# Patient Record
Sex: FEMALE
[De-identification: names, ages, dates, MRNs, and addresses within clinical notes are randomized per-mention and may not be internally consistent; named-entity substitution may affect disease eponyms.]

---

## 2017-08-01 ENCOUNTER — HOSPITAL ENCOUNTER (OUTPATIENT)
Dept: PHYSICAL THERAPY | Facility: CLINIC | Age: 32
Setting detail: THERAPIES SERIES
Discharge: HOME | End: 2017-08-01

## 2017-08-01 PROCEDURE — 97112 NEUROMUSCULAR REEDUCATION: CPT

## 2017-08-01 PROCEDURE — 97110 THERAPEUTIC EXERCISES: CPT

## 2017-08-01 PROCEDURE — 97161 PT EVAL LOW COMPLEX 20 MIN: CPT

## 2017-08-10 ENCOUNTER — HOSPITAL ENCOUNTER (OUTPATIENT)
Dept: PHYSICAL THERAPY | Facility: CLINIC | Age: 32
Setting detail: THERAPIES SERIES
Discharge: HOME | End: 2017-08-10

## 2017-08-11 ENCOUNTER — HOSPITAL ENCOUNTER (OUTPATIENT)
Dept: PHYSICAL THERAPY | Facility: CLINIC | Age: 32
Setting detail: THERAPIES SERIES
Discharge: HOME | End: 2017-08-11

## 2017-08-11 PROCEDURE — 9990000011 HC NO CHARGE THERAPY VISIT

## 2017-11-08 ENCOUNTER — HOSPITAL ENCOUNTER (OUTPATIENT)
Dept: PHYSICAL THERAPY | Facility: CLINIC | Age: 32
Setting detail: THERAPIES SERIES
Discharge: HOME | End: 2017-11-08

## 2017-11-08 PROCEDURE — 97161 PT EVAL LOW COMPLEX 20 MIN: CPT

## 2017-11-08 PROCEDURE — 97140 MANUAL THERAPY 1/> REGIONS: CPT

## 2017-11-08 PROCEDURE — 9990000011 HC NO CHARGE THERAPY VISIT

## 2017-11-08 NOTE — CONSULTS
[address] [facility] Medicine Evaluation          Date:  2017  Patient: Amira Estrada  : 1985  MRN: [id number]  Physician: Dr. Nabil Khan DO   Insurance: Self-Pay  Medical Diagnosis: SI Pain, R side   Rehab Codes: M99.04  Onset date: 17  Next 's appt.: NA         Mechanism of injury:  Pt with SI pain over the last few months, worse with running causing her gait to be off and she is feeling popping in the right hip and peroneal irritation since. Went to see Chiropractor a few months ago, relieved but still having pain. Pain is focusing on R side SI, carrying her toddler can cause symptoms. PMH:  PT for core strength         Pain present? yes   Location R SI jt   Pain Rating currently 4/10   Pain at worse 5/10   Pain at best 0/10   Description of pain Constant dull ache, sharp at times   Altered Sensation none   What makes it worse Sleeping on the R side, running   What makes it better Running, sitting   Pain altered treatment/action no   Symptom progression    Sleep Can't sleep on right side                     Palpation/Pain:  RLE piriformis, hip flexor pain          Somatic Dysfunctions Normal Deficit Details   Cervical   [] []    Thoracic   [] [x] FRSR T4-T6   Rib   [] [x] R 4th-6th rib post  R 8th rib post   Pelvis   [] [x] R upslip  Pubic dysfunction   Lumbar [] [x] FRSR L4   SI   [] [x] Bilat extended sacrum            __________________________________________________________________  Assessment                      STG: (to be met in 10 treatments)  1. ? Pain: Pt to decrease pain levels to 0/10  2. ? ROM: Increase ROM to [facility] to allow for normal participation in sports  3. ? Strength: Increase strength to [facility] for normal sports and stability of joint   4. Independent with Home Exercise Programs    LTG: (to be met in 15 treatments)  1.  Return to participation in sport without restriction            ____________________________________________________________         Plan:  1 x week for 15

## 2017-12-26 ENCOUNTER — HOSPITAL ENCOUNTER (OUTPATIENT)
Dept: PHYSICAL THERAPY | Facility: CLINIC | Age: 32
Setting detail: THERAPIES SERIES
Discharge: HOME | End: 2017-12-26

## 2017-12-26 PROCEDURE — 9990000011 HC NO CHARGE THERAPY VISIT

## 2017-12-26 NOTE — FLOWSHEET NOTE
Increase ROM to [facility] to allow for normal participation in sports  3. ? Strength: Increase strength to [facility] for normal sports and stability of joint       4. Independent with Home Exercise Programs     LTG: (to be met in 15 treatments)  1. Return to participation in sport without restriction       Pt. Education:  [x] Yes  [] No  [] Reviewed Prior HEP/Ed  Method of Education: [x] Verbal  [] Demo  [] Written  Comprehension of Education:  [x] Verbalizes understanding. [] Demonstrates understanding. [] Needs review. [] Demonstrates/verbalizes HEP/Ed previously given. Plan: [x] Continue per plan of care. She is to run this afternoon and report back. Her pre run program should include LE ex and hip flexor stretches.     [] Other:      Time In:1402            Time Out: 1450    Electronically signed by:  Jenny Dangelo, PT

## 2018-02-15 ENCOUNTER — HOSPITAL ENCOUNTER (OUTPATIENT)
Dept: PHYSICAL THERAPY | Facility: CLINIC | Age: 33
Setting detail: THERAPIES SERIES
Discharge: HOME | End: 2018-02-15

## 2018-02-15 PROCEDURE — 9990000011 HC NO CHARGE THERAPY VISIT

## 2018-02-15 NOTE — FLOWSHEET NOTE
ROM to [facility] to allow for normal participation in sports  3. ? Strength: Increase strength to [facility] for normal sports and stability of joint       4. Independent with Home Exercise Programs     LTG: (to be met in 15 treatments)  1. Return to participation in sport without restriction        Pt. Education:  [x] Yes  [] No  [] Reviewed Prior HEP/Ed  Method of Education: [x] Verbal  [x] Demo  [x] Written  Comprehension of Education:  [x] Verbalizes understanding. [x] Demonstrates understanding. [] Needs review. [] Demonstrates/verbalizes HEP/Ed previously given. Plan: [x] Continue per plan of care.    [] Other:      Time In:1030            Time Out: [address]    Electronically signed by:  Shruthi Clarke, PT

## 2018-02-28 ENCOUNTER — HOSPITAL ENCOUNTER (OUTPATIENT)
Dept: PHYSICAL THERAPY | Facility: CLINIC | Age: 33
Setting detail: THERAPIES SERIES
Discharge: HOME | End: 2018-02-28

## 2018-02-28 PROCEDURE — 9990000011 HC NO CHARGE THERAPY VISIT

## 2018-02-28 NOTE — FLOWSHEET NOTE
[] Ada. [address] [facility] [employer]  [address] [place] [address]   Phone: [phone]   Fax:  [phone]     Physical Therapy Daily Treatment Note    Date:  2018  Patient Name:  Parul Garnica    :  1985  MRN: [id number]  Physician: Dr. Meena Love,     Insurance: Self-Pay  Medical Diagnosis: SI Pain, R side  Rehab Codes: M99.04  Onset date: 17                  Next 's appt.: NA  Visit# / total visits: /10  Cancels/No Shows: 0/0    Subjective:    Pain:  [x] Yes  [] No Location:RLE hip, SI, L/spine pain   Pain Rating: (0-10 scale)  Pain altered Tx:  [x] No  [] Yes  Action:  Comments: Pt has hamstring that aches constantly. Pain is more when the foot in on the ground. Eases up at 3 miles, but can keep going until 5-6 miles. As for LB, tightness in upper back. Has mid back pain when she goes to bed, for which she takes IB and uses a HP. Does not bother her when running. Affects her run quality. R piriformis. Doing HEP     Objective:  Exercises:  Exercise Reps/ Time Weight/ Level Comments   Supine      ADIM 2x10     Prone      ADIM 2x10     Quadruped      ADIM 2x10     Leg raise 15                             Other:  major hip ROM: wnl  MMT: 5/5 with all hip movements major  HS was 5/5 at 90/10 deg. No pain with fast high HS stretch  Palp: Major psoas and iliacus, Major paraspinals, neg on QL. Initially major glut med and piriformis were tender, but improved after DI to hip flexors and paraspinal.     Specific Instructions for next treatment:    Treatment Charges: Mins Units   []  Modalities     []  Ther Exercise     []  Manual Therapy     []  Ther Activities     []  Aquatics     []  Vasocompression     [x]  Other Self pay    Total Treatment time 40        Assessment: [x] Progressing toward goals. [] No change. [x] Other:  Pt educated on proper posture and not sitting to erect, so to decrease resting tone of ant pelvic tilt muscles.  Spent considerable time reviewing computer workstation

## 2018-04-02 ENCOUNTER — HOSPITAL ENCOUNTER (OUTPATIENT)
Dept: PHYSICAL THERAPY | Facility: CLINIC | Age: 33
Setting detail: THERAPIES SERIES
Discharge: HOME | End: 2018-04-02

## 2018-04-02 PROCEDURE — 9990000011 HC NO CHARGE THERAPY VISIT

## 2018-04-16 ENCOUNTER — HOSPITAL ENCOUNTER (OUTPATIENT)
Dept: PHYSICAL THERAPY | Facility: CLINIC | Age: 33
Setting detail: THERAPIES SERIES
Discharge: HOME | End: 2018-04-16

## 2018-04-16 PROCEDURE — 9990000011 HC NO CHARGE THERAPY VISIT

## 2018-04-26 ENCOUNTER — HOSPITAL ENCOUNTER (OUTPATIENT)
Dept: PHYSICAL THERAPY | Facility: CLINIC | Age: 33
Setting detail: THERAPIES SERIES
Discharge: HOME | End: 2018-04-26

## 2018-04-26 PROCEDURE — 9990000011 HC NO CHARGE THERAPY VISIT

## 2018-05-07 ENCOUNTER — HOSPITAL ENCOUNTER (OUTPATIENT)
Dept: PHYSICAL THERAPY | Facility: CLINIC | Age: 33
Setting detail: THERAPIES SERIES
Discharge: HOME | End: 2018-05-07

## 2018-05-07 PROCEDURE — 9990000011 HC NO CHARGE THERAPY VISIT

## 2018-05-22 ENCOUNTER — HOSPITAL ENCOUNTER (OUTPATIENT)
Dept: PHYSICAL THERAPY | Facility: CLINIC | Age: 33
Setting detail: THERAPIES SERIES
Discharge: HOME | End: 2018-05-22

## 2018-05-22 PROCEDURE — 9990000011 HC NO CHARGE THERAPY VISIT

## 2018-06-18 ENCOUNTER — HOSPITAL ENCOUNTER (OUTPATIENT)
Dept: PHYSICAL THERAPY | Facility: CLINIC | Age: 33
Setting detail: THERAPIES SERIES
Discharge: HOME | End: 2018-06-18

## 2018-06-18 PROCEDURE — 9990000011 HC NO CHARGE THERAPY VISIT

## 2018-06-26 ENCOUNTER — HOSPITAL ENCOUNTER (OUTPATIENT)
Dept: PHYSICAL THERAPY | Facility: CLINIC | Age: 33
Setting detail: THERAPIES SERIES
Discharge: HOME | End: 2018-06-26

## 2018-06-26 PROCEDURE — 9990000011 HC NO CHARGE THERAPY VISIT

## 2018-07-20 ENCOUNTER — HOSPITAL ENCOUNTER (OUTPATIENT)
Dept: PHYSICAL THERAPY | Facility: CLINIC | Age: 33
Setting detail: THERAPIES SERIES
Discharge: HOME | End: 2018-07-20

## 2018-07-20 NOTE — FLOWSHEET NOTE
[] [facility]        Outpatient Physical                Therapy       [address]       Phone: [phone]       Fax: [phone] [] [facility] at [address] [facility]       Phone: [phone]       Fax: [phone] [x] Ada.  [address] [place] [address]      Phone: [phone]      Fax:  [phone]     Physical Therapy Cancel/No Show note    Date: 2018  Patient: Guadalupe Izaguirre  : 1985  MRN: [id number]    Cancels/No Shows to date:     For today's appointment patient:  [x]  Cancelled  []  Rescheduled appointment  []  No-show     Reason given by patient:  []  Patient ill  []  Conflicting appointment  []  No transportation    []  Conflict with work  []  No reason given  []  Weather related  [x]  Other:     Comments:  Sick child   Electronically signed by: Marcin Thomson

## 2018-08-02 ENCOUNTER — HOSPITAL ENCOUNTER (OUTPATIENT)
Dept: PHYSICAL THERAPY | Facility: CLINIC | Age: 33
Setting detail: THERAPIES SERIES
Discharge: HOME | End: 2018-08-02

## 2018-08-02 PROCEDURE — 9990000011 HC NO CHARGE THERAPY VISIT

## 2018-08-10 ENCOUNTER — HOSPITAL ENCOUNTER (OUTPATIENT)
Dept: PHYSICAL THERAPY | Facility: CLINIC | Age: 33
Setting detail: THERAPIES SERIES
Discharge: HOME | End: 2018-08-10

## 2018-08-10 PROCEDURE — 9990000011 HC NO CHARGE THERAPY VISIT

## 2018-08-10 NOTE — FLOWSHEET NOTE
[] [facility] [address] [place] [address] [employer]  [address] [place] [address]   Phone: [phone]   Fax:  [phone]     Physical Therapy Daily Treatment Note    Date:  8/10/2018  Patient Name:  Jim Casiano    :  1985  MRN: [id number]  Physician: Dr. Burke Corral,      Insurance: Self-Pay  Medical Diagnosis: SI Pain, R side Rehab Codes: M99.04  Onset date: 17                  Next 's appt.: NA  Visit# / total visits: 12  Cancels/No Shows: 0/0    Subjective:    Pain:  [x] Yes  [] No Location:RLE hip, pelvis/SI  Pain Ratin/10 (0-10 scale)  Pain altered Tx:  [x] No  [] Yes  Action:  Comments: Pt with R side mild midback pain, cervical tightness noted. Pain is worse with activity, worse with leaning down and looking at the laptop. Objective:  Exercises:  Somatic Dysfunctions Normal Deficit Details   Cervical   [] [x] FSL OA-MET  RRot AA-MET  FRSL C3-MET  UT, L.scap MFR R   Thoracic   [] [x] FRSR T4-T5-MOB     Rib   [] [x] R 1st rib elevated-MET  R 2nd rib post  R 4-5th rib post-MET, MOB   Pelvis   [] []    Lumbar [] []    SI   [] []      Discussed Foam roller Tspine for HEP      Specific Instructions for next treatment: advance HEP, monitor posture     Treatment Charges: Mins Units   []  Modalities     []  Ther Exercise     []  Manual Therapy     []  Ther Activities     []  Aquatics     []  Vasocompression     []  Other Self pay    Total Treatment time 30        Assessment: [x] Progressing toward goals. Tolerated manual well, less pain and increased mobility note after     [] No change. [x] Other:       STG/LTG:        STG: (to be met in 10 treatments)  1. ? Pain: Pt to decrease pain levels to 0/10  2. ? ROM: Increase ROM to [facility] to allow for normal participation in sports  3. ? Strength: Increase strength to [facility] for normal sports and stability of joint       4. Independent with Home Exercise Programs     LTG: (to be met in 15 treatments)  1.  Return to participation in sport without restriction Pt. Education:  [x] Yes  [] No  [] Reviewed Prior HEP/Ed  Method of Education: [x] Verbal  [x] Demo  [] Written   Comprehension of Education:  [x] Verbalizes understanding. [x] Demonstrates understanding. [] Needs review. [] Demonstrates/verbalizes HEP/Ed previously given. Plan: [] Continue per plan of care. [x] Other: continue as needed.        Time In: 1130  Time Out:1202    Electronically signed by:  Kilo Parker, PT

## 2018-09-11 ENCOUNTER — HOSPITAL ENCOUNTER (OUTPATIENT)
Dept: PHYSICAL THERAPY | Facility: CLINIC | Age: 33
Setting detail: THERAPIES SERIES
Discharge: HOME | End: 2018-09-11

## 2018-09-11 PROCEDURE — 9990000011 HC NO CHARGE THERAPY VISIT

## 2018-09-11 NOTE — FLOWSHEET NOTE
[] [facility]  [address] [facility]   Phone: [phone]   Fax:  [phone]     Physical Therapy Daily Treatment Note    Date:  2018  Patient Name:  Joie Rasheed    :  1985  MRN: [id number]  Physician: Dr. Olivia Cuellar, DO     Insurance: PT retail self-pay  Medical Diagnosis: SI Pain, R side Rehab Codes: M99.04  Onset date: 17                  Next 's appt.: NA  Visit# / total visits: 12  Cancels/No Shows: 0/0    Subjective:    Pain:  [x] Yes  [] No Location:RLE hip, pelvis/SI  Pain Ratin/10 (0-10 scale)  Pain altered Tx:  [x] No  [] Yes  Action:  Comments: returns to PT for persistent R calf/peroneal pain. On her last 18 mile run, she felt awful in her legs and felt dizzy, but notes she was also ill. Doing HEP, minimal mileage. Objective:  Exercises:  Home Exercise Reps/ Time Weight/ Level Comments   Banded hip drive 20 black    Lower post sling ex 20 blue    DKTC in pushup position on PB  blue Hands on floor   Marches in crab walk position 10     Eccentric calf raises 15 x 6 second drop; standing and seated. Other:  Manual: stick rolled R calf, peroneals and quad. Reviewed  Nutrition pre run and during the run. Specific Instructions for next treatment:    Treatment Charges: Mins Units   []  Modalities     []  Ther Exercise     []  Manual Therapy     []  Ther Activities     []  Aquatics     []  Vasocompression     []  Other Self pay    Total Treatment time 30        Assessment: [x] Progressing toward goals. presents with a significant amount of tenderness in LE muscles on today's date. Encouraged her to do a little more preventative maintenance to lower legs (rolling with assistance, eccentrics) as well as improving nutrition. Referred her to [employer] for additional information. [] No change.      [x] Other:       STG/LTG:        STG: (to be met in 10 treatments)  1. ? Pain: Pt to decrease pain levels to 0/10  2. ? ROM: Increase ROM to [facility] to allow for normal participation in sports  3. ? Strength: Increase strength to [facility] for normal sports and stability of joint       4. Independent with Home Exercise Programs     LTG: (to be met in 15 treatments)  1. Return to participation in sport without restriction     Pt. Education:  [x] Yes  [] No  [] Reviewed Prior HEP/Ed  Method of Education: [x] Verbal  [x] Demo  [] Written   Comprehension of Education:  [x] Verbalizes understanding. [x] Demonstrates understanding. [] Needs review. [] Demonstrates/verbalizes HEP/Ed previously given. Plan: [] Continue per plan of care. [x] Other: continue as needed. She has 6 wks to [address] She will try adding a 3 mile tempo later this week. She will also monitor resting heart rate to ensure that she is not overtraining. If she thinks she is not ready for full, she may drop to the half and use [place] as a half [place] race.    Time In: 0900   Time LZD:6705    Electronically signed by:  Ciara Guzman, PT

## 2018-09-28 ENCOUNTER — HOSPITAL ENCOUNTER (OUTPATIENT)
Dept: PHYSICAL THERAPY | Facility: CLINIC | Age: 33
Setting detail: THERAPIES SERIES
Discharge: HOME | End: 2018-09-28

## 2018-09-28 PROCEDURE — 9990000011 HC NO CHARGE THERAPY VISIT

## 2018-09-28 NOTE — FLOWSHEET NOTE
[] Ada. [address] [facility] [employer]  [address] [place] [address]   Phone: [phone]   Fax:  [phone]     Physical Therapy Daily Treatment Note    Date:  2018  Patient Name:  Gilberto Stapleton    :  1985  MRN: [id number]  Physician: Dr. Vincent Cortez,      Insurance: Self-Pay  Medical Diagnosis: SI Pain, R side Rehab Codes: M99.04  Onset date: 17                  Next Dr's appt.: NA  Visit# / total visits: 14  Cancels/No Shows: 0/0    Subjective:    Pain:  [x] Yes  [] No Location:RLE hip, pelvis/SI  Pain Ratin/10 (0-10 scale)  Pain altered Tx:  [x] No  [] Yes  Action:  Comments: Pt with Left side midback pain, RLE hip and post thigh tightness. Reports adherence to HEP but has slacked on it the last few weeks. Objective:  Exercises:  Somatic Dysfunctions Normal Deficit Details   Cervical   [] [x]   UT, L.scap MFR R   Thoracic   [] [x] FRSR T4-T5-MOB     Rib   [] [x] L 1st rib elevated-MET  L/R 2nd rib post  L 4-6th rib post-MET, MOB   Pelvis   [] []    Lumbar [] []    SI   [] [x] RLE piriformis, hip flexor DI     Discussed Foam roller Tspine for HEP      Specific Instructions for next treatment: advance HEP, monitor posture     Treatment Charges: Mins Units   []  Modalities     []  Ther Exercise     []  Manual Therapy     []  Ther Activities     []  Aquatics     []  Vasocompression     []  Other Self pay    Total Treatment time 30        Assessment: [x] Progressing toward goals. Tolerated manual with discomfort during but relief of symptoms after     [] No change. [] Other:       STG/LTG:        STG: (to be met in 10 treatments)  1. ? Pain: Pt to decrease pain levels to 0/10  2. ? ROM: Increase ROM to [facility] to allow for normal participation in sports  3. ? Strength: Increase strength to [facility] for normal sports and stability of joint       4. Independent with Home Exercise Programs     LTG: (to be met in 15 treatments)  1. Return to participation in sport without restriction     Pt. Education:  [x] Yes  [] No  [] Reviewed Prior HEP/Ed  Method of Education: [x] Verbal  [x] Demo  [] Written   Comprehension of Education:  [x] Verbalizes understanding. [x] Demonstrates understanding. [] Needs review. [] Demonstrates/verbalizes HEP/Ed previously given. Plan: [] Continue per plan of care. [x] Other: continue as needed.        Time In: 1600 Time Out:1630    Electronically signed by:  Argelia Chinchilla PT

## 2018-10-23 ENCOUNTER — HOSPITAL ENCOUNTER (OUTPATIENT)
Dept: PHYSICAL THERAPY | Facility: CLINIC | Age: 33
Discharge: HOME | End: 2018-10-23

## 2018-10-23 PROCEDURE — 9900000073 HC MANUAL THERAPY PER 15 MIN (SELF-PAY)

## 2018-11-02 ENCOUNTER — HOSPITAL ENCOUNTER (OUTPATIENT)
Dept: PHYSICAL THERAPY | Facility: CLINIC | Age: 33
Discharge: HOME | End: 2018-11-02

## 2018-11-02 PROCEDURE — 9900000073 HC MANUAL THERAPY PER 15 MIN (SELF-PAY)

## 2018-12-13 ENCOUNTER — APPOINTMENT (OUTPATIENT)
Dept: PHYSICAL THERAPY | Facility: CLINIC | Age: 33
End: 2018-12-13
Payer: COMMERCIAL

## 2018-12-14 ENCOUNTER — HOSPITAL ENCOUNTER (OUTPATIENT)
Dept: PHYSICAL THERAPY | Facility: CLINIC | Age: 33
Setting detail: THERAPIES SERIES
Discharge: HOME | End: 2018-12-14
Payer: COMMERCIAL

## 2018-12-14 PROCEDURE — 97140 MANUAL THERAPY 1/> REGIONS: CPT

## 2019-01-03 ENCOUNTER — HOSPITAL ENCOUNTER (OUTPATIENT)
Dept: PHYSICAL THERAPY | Facility: CLINIC | Age: 34
Setting detail: THERAPIES SERIES
Discharge: HOME | End: 2019-01-03
Payer: COMMERCIAL

## 2019-01-03 PROCEDURE — 97140 MANUAL THERAPY 1/> REGIONS: CPT

## 2019-02-28 ENCOUNTER — HOSPITAL ENCOUNTER (OUTPATIENT)
Dept: PHYSICAL THERAPY | Facility: CLINIC | Age: 34
Setting detail: THERAPIES SERIES
Discharge: HOME | End: 2019-02-28
Payer: COMMERCIAL

## 2019-02-28 PROCEDURE — 97140 MANUAL THERAPY 1/> REGIONS: CPT

## 2019-08-22 ENCOUNTER — HOSPITAL ENCOUNTER (OUTPATIENT)
Dept: PHYSICAL THERAPY | Facility: CLINIC | Age: 34
Discharge: HOME | End: 2019-08-22

## 2019-08-22 PROCEDURE — 9900000073 HC MANUAL THERAPY PER 15 MIN (SELF-PAY)

## 2019-08-22 PROCEDURE — 9900000066 HC EVALUATION (SELF-PAY)

## 2019-08-22 PROCEDURE — 9900000072 HC NEUROMUSCULAR RE-EDUCATION (SELF-PAY)

## 2019-08-22 PROCEDURE — 9900000067 HC THERAPEUTIC EXERCISE EA 15 MINS (SELF-PAY)

## 2019-08-22 NOTE — CONSULTS
[x] [facility] and Therapy    [address] [place] [address]    Phone: [phone]    Fax:  [phone]     Physical Therapy Running Evaluation    Date:  2019  Patient: Dayday Santiago   : 1985  MRN: [id number]  Physician: Dr. Jackie Baker: Retail [employer]  Medical Diagnosis: R hip pain Rehab Codes: M25.551  Onset date: 19   Next Dr's appt.: prn    Subjective:  CC:  Stopped running at week 24 of pregnancy because it was uncomfortable at that point. Baby is now 9 weeks old. Return to run with run/walk up to 2 miles Did that for a couple weeks then started just running. Going 2-3 miles 2-3 days per week. Having R hip an Rib pain. Also get pelvic pain. Imani is at 160 spm and just can't seem to get that imani up. Describes the hip as an annoying pain.  Better with  stretch and rest. Have to give a rest day between runs due to pubic symphysis pain       PMHx: [] Unremarkable [] Diabetes [] HTN  [] Pacemaker   [] MI/Heart Problems [] Cancer [] Arthritis [] Other:              [x] Refer to full medical chart  In EPIC     Tests: [] X-Ray: [] MRI:  [x] none:     Medications: [x] Refer to full medical record [] None [] Other:  Allergies:      [x] Refer to full medical record [] None [] Other:    Working:  [x] Normal Duty  [] Light Duty  [] Off D/T Condition  [] Retired    [] Not Employed    []  Disability  [] Other:           Return to work:     Job/ADL Description:        Pain:  [x] Yes  [] No Location: R hip  Pain Rating: (0-10 scale) 5/10 worst, 3/10 now  Pain altered Tx:  [] Yes  [] No  Action:  Symptoms:  [] Improving [] Worsening [x] Same  Better:  [] AM    [] PM    [] Sit    [x] Not running  []Stand    [] Walk    [x] Stretching  [] Other:  Worse: [] AM    [] PM    [] Sit    []Stand    [] Walk    [] Stairs    [x] Run    [] Other:  Sleep: [x] OK    [] Disturbed    Objective:    ROM  ° A/P STRENGTH TESTS (+/-) Left Right Not Tested

## 2019-08-29 ENCOUNTER — HOSPITAL ENCOUNTER (OUTPATIENT)
Dept: PHYSICAL THERAPY | Facility: CLINIC | Age: 34
Discharge: HOME | End: 2019-08-29

## 2019-08-29 PROCEDURE — 9900000067 HC THERAPEUTIC EXERCISE EA 15 MINS (SELF-PAY)

## 2019-08-29 PROCEDURE — 9900000073 HC MANUAL THERAPY PER 15 MIN (SELF-PAY)

## 2019-08-29 PROCEDURE — 9900000072 HC NEUROMUSCULAR RE-EDUCATION (SELF-PAY)

## 2019-08-29 NOTE — FLOWSHEET NOTE
[] [address]    Outpatient Rehabilitation &  Therapy  [address] [place] [address]  P: [phone]  F: [phone]     Physical Therapy Daily Treatment Note    Date:  2019  Patient Name:  Tonny Langford    :  1985  MRN: [id number]  Medical Diagnosis: R hip pain          Rehab Codes: M25.551  Onset date: 19                           Next Dr's appt.: prn    Subjective:    Pain:  [] Yes  [] No Location: R hip Pain Rating: (0-10 scale) 0/10  Pain altered Tx:  [] No  [] Yes  Action:  Comments:Feeling better. Did get a little kick up of my plantarfascitis that I've had in the past however I was carrying my bike barefoot and that is when it flared. Pelvic pain is pretty much gone with running.     Objective:  Manual: MET to correct R pelvic upslip, MOB FRSR T6-8, MET Post rib 6-8, DI B hip flexor prox, distal, glute med  Precautions: standard  Exercises:  Exercise Reps/ Time Weight/ Level Issued for HEP   Comments   Prone             Flying squirrels  x15    x  x  isometric   Hip ext (glut max)                           Supine             Hip flexor s 2'   x x     1 legged bridges              bridges x20   x x     Pelvic floor yarely 5sec hold 5 sec  eccentric   x      Sidelying             Warrior stretch x5 ea   x x Pillow between knees   [place] stretch x5 ea   x x Pillow between knees   Clams 90/30 deg 2 sets   x x     Mary Jo hip abd 2 sets   x x     Gym             Arm Circles x20 [place] x x    Pull aparts x20 lime x x    Lunges             Monster walks             Heel taps             Step downs         Posterior and lateral   Other:      Specific Instructions for next treatment:Add quadruped  NMR   Warm up: 3 min walk              Pace:  11:06 min/mile              Duration: 16 minutes                 Shoes: Miller pureflow              Nora: 176  spm    Cues: metronome to cue nora and lift heel at toe off  Treatment Charges: Mins Units   []  Modalities     [x]  Ther Exercise 20 1

## 2019-09-12 ENCOUNTER — HOSPITAL ENCOUNTER (OUTPATIENT)
Dept: PHYSICAL THERAPY | Facility: CLINIC | Age: 34
Discharge: HOME | End: 2019-09-12

## 2019-09-12 PROCEDURE — 9900000072 HC NEUROMUSCULAR RE-EDUCATION (SELF-PAY)

## 2019-09-12 PROCEDURE — 9900000073 HC MANUAL THERAPY PER 15 MIN (SELF-PAY)

## 2019-10-28 ENCOUNTER — HOSPITAL ENCOUNTER (OUTPATIENT)
Dept: PHYSICAL THERAPY | Facility: CLINIC | Age: 34
Discharge: HOME | End: 2019-10-28

## 2019-10-28 PROCEDURE — 9900000067 HC THERAPEUTIC EXERCISE EA 15 MINS (SELF-PAY)

## 2019-10-28 PROCEDURE — 9900000073 HC MANUAL THERAPY PER 15 MIN (SELF-PAY)

## 2019-10-28 PROCEDURE — 9900000072 HC NEUROMUSCULAR RE-EDUCATION (SELF-PAY)

## 2019-11-25 ENCOUNTER — OFFICE VISIT (OUTPATIENT)
Dept: ORTHOPEDIC SURGERY | Age: 34
End: 2019-11-25
Payer: COMMERCIAL

## 2019-11-25 VITALS
WEIGHT: 145 LBS | DIASTOLIC BLOOD PRESSURE: 74 MMHG | HEIGHT: 67 IN | HEART RATE: 64 BPM | SYSTOLIC BLOOD PRESSURE: 113 MMHG | BODY MASS INDEX: 22.76 KG/M2

## 2019-11-25 DIAGNOSIS — M21.862 GASTROCNEMIUS EQUINUS OF LEFT LOWER EXTREMITY: Primary | ICD-10-CM

## 2019-11-25 DIAGNOSIS — M20.22 HALLUX RIGIDUS OF LEFT FOOT: ICD-10-CM

## 2019-11-25 DIAGNOSIS — M21.6X9 CAVUS DEFORMITY OF FOOT, ACQUIRED: ICD-10-CM

## 2019-11-25 PROCEDURE — 99203 OFFICE O/P NEW LOW 30 MIN: CPT | Performed by: ORTHOPAEDIC SURGERY

## 2019-11-25 RX ORDER — NORETHINDRONE AND ETHINYL ESTRADIOL AND FERROUS FUMARATE 0.8-25(24)
1 KIT ORAL
COMMUNITY
Start: 2019-09-18

## 2019-12-03 ENCOUNTER — HOSPITAL ENCOUNTER (OUTPATIENT)
Dept: PHYSICAL THERAPY | Facility: CLINIC | Age: 34
Discharge: HOME | End: 2019-12-03

## 2019-12-03 PROCEDURE — 9900000073 HC MANUAL THERAPY PER 15 MIN (SELF-PAY)

## 2019-12-17 ENCOUNTER — HOSPITAL ENCOUNTER (OUTPATIENT)
Dept: PHYSICAL THERAPY | Facility: CLINIC | Age: 34
Discharge: HOME | End: 2019-12-17

## 2019-12-17 PROCEDURE — 9900000067 HC THERAPEUTIC EXERCISE EA 15 MINS (SELF-PAY)

## 2020-01-28 ENCOUNTER — HOSPITAL ENCOUNTER (OUTPATIENT)
Dept: PHYSICAL THERAPY | Facility: CLINIC | Age: 35
Discharge: HOME | End: 2020-01-28

## 2020-01-28 PROCEDURE — 9900000073 HC MANUAL THERAPY PER 15 MIN (SELF-PAY)

## 2020-01-28 NOTE — FLOWSHEET NOTE
proximal iliacus and psoas, distal attachment, piriformis, and glut med  2. Shotgun maneuver    Specific Instructions for next treatment:      Treatment Charges: Mins Units   []  Modalities     []  Ther Exercise     [x]  Manual Therapy 20 1   []  Ther Activities     []  Aquatics     []  Vasocompression     []  NMR     Total Treatment time 20 1       Assessment: [x] Progressing toward goals. Initially she presented with significant tenderness on hip flexor, piriformis and glut med. + sheer test on R, R hip abd MMT was 4+/5 and limited hip extension. However, after manual, sheer test was neg, tenderness had improved, R hip abd MMT improved to 5/5 and has full hip extension. [] No change. [] Other:        STG: (to be met in 10 treatments)  1. ? Pain: <3/10 at worst to allow pt to continue to run  2. ? Strength:5/5 B hip strength to allow pt to hold corrections made with manual techniques  3. ? Function: Pt will be able to run up to 4 miles with <3/10 pain Hip, ribs and pelvis  4. Independent with Home Exercise Programs     LTG: (to be met in 20 treatments)  1. Increase functional strength to allow pt to demonstrate little to no deviation with single leg squat   2. Pt able to run painfree as she wishes with acceptable run mechanics. Patient goals:Build strength so can return to 25 miles/wk of running and run 1/2 marathon at [facility]      Pt. Education:  [x] Yes  [] No  [] Reviewed Prior HEP/Ed  Method of Education: [x] Verbal  [] Demo  [x] Written- for glut max ex and calf S  Comprehension of Education:  [x] Verbalizes understanding. [] Demonstrates understanding. [] Needs review. [] Demonstrates/verbalizes HEP/Ed previously given. Plan: [x] Continue per plan of care.    [x] Other: she wishes to follow up as needed    Time In: 1405   Time Out: 1425    Electronically signed by:  Versie Paget, PT

## 2020-02-11 ENCOUNTER — HOSPITAL ENCOUNTER (OUTPATIENT)
Dept: PHYSICAL THERAPY | Facility: CLINIC | Age: 35
Discharge: HOME | End: 2020-02-11

## 2020-02-20 ENCOUNTER — HOSPITAL ENCOUNTER (OUTPATIENT)
Dept: PHYSICAL THERAPY | Facility: CLINIC | Age: 35
Discharge: HOME | End: 2020-02-20

## 2020-02-20 PROCEDURE — 9900000067 HC THERAPEUTIC EXERCISE EA 15 MINS (SELF-PAY)

## 2020-02-20 PROCEDURE — 9900000073 HC MANUAL THERAPY PER 15 MIN (SELF-PAY)

## 2020-05-12 ENCOUNTER — HOSPITAL ENCOUNTER (OUTPATIENT)
Dept: PHYSICAL THERAPY | Facility: CLINIC | Age: 35
Discharge: HOME | End: 2020-05-12

## 2020-05-12 PROCEDURE — 9900000073 HC MANUAL THERAPY PER 15 MIN (SELF-PAY)

## 2020-05-12 PROCEDURE — 9900000067 HC THERAPEUTIC EXERCISE EA 15 MINS (SELF-PAY)

## 2020-05-21 ENCOUNTER — HOSPITAL ENCOUNTER (OUTPATIENT)
Dept: PHYSICAL THERAPY | Facility: CLINIC | Age: 35
Discharge: HOME | End: 2020-05-21

## 2020-05-21 PROCEDURE — 9900000073 HC MANUAL THERAPY PER 15 MIN (SELF-PAY)

## 2020-05-21 PROCEDURE — 9900000072 HC NEUROMUSCULAR RE-EDUCATION (SELF-PAY)

## 2020-05-21 PROCEDURE — 9900000067 HC THERAPEUTIC EXERCISE EA 15 MINS (SELF-PAY)

## 2020-05-21 NOTE — FLOWSHEET NOTE
[] [address]   Outpatient Rehabilitation &  Therapy  [address] [facility]  P: [phone]  F: [phone]     Physical Therapy Daily Treatment Note    Date:  2020  Patient Name:  Naida Dimas    :  1985  MRN: [id number]  Physician: Dr. Carroll Kulkarni: [employer]  Medical Diagnosis: R hip pain         Rehab Codes: M25.551  Onset date: 19                          Next 's appt.: prn  Visits:     Cancellations/No shows:  0/0    Subjective:    Pain:  [x] Yes  [] No Location: R hip Pain Rating: (0-10 scale) 4/10  Pain altered Tx:  [x] No  [] Yes  Action:  Comments:Pt reports she is back to having R hip pain again and can only do 2-3 miles.      Objective:  Manual: MET to correct pelvic dysfunction, DI  Glute med, piriformis,  Posterior glide distal tib/fib R  Precautions: standard  Exercises:  Exercise Reps/ Time Weight/ Level Issued for HEP   Comments   Prone             Flying squirrels 2x10  12/17  2 pillows   Hip ext (glut max)  2x10   /17  2 pillows; monitor pelvic rotation   ADIM 15   /17      Supine             Hip flexor s 2'   x      Posture Bridges w/march 2 sets   x  x    Bridge  20   x  No pelvic rotation   Pelvic floor yarely 5sec hold 5 sec  eccentric   x      Quadruped        ADIM 20  12/17     Arm/leg raise 20  /17     Sidelying             Warrior stretch x5 ea   x -- Pillow between knees   [place] stretch x5 ea   x -- Pillow between knees   Clams 90/30 deg 2 sets   x --     Mary Jo hip abd 2 sets   x --     Gym             Supine thoracic extension on 1/2 foam roller x3'       Wall pushover running drill x30   x    [place] Single leg deadlift 2x15 [place]  KB  x barefoot    plank w/ KB pull through Verbal review Purple KB x     MOBO board deadlift 2x10  x     Dynamic lunge 2laps  x     Running drill 2x30\"  x     Arm Circles x20 Blue x     Pull aparts x20 Blue x     Eccentric calf 1 set  x     Lunges             Monster walks  2 laps

## 2020-08-06 ENCOUNTER — HOSPITAL ENCOUNTER (OUTPATIENT)
Dept: PHYSICAL THERAPY | Facility: CLINIC | Age: 35
Discharge: HOME | End: 2020-08-06

## 2020-08-06 PROCEDURE — 9900000067 HC THERAPEUTIC EXERCISE EA 15 MINS (SELF-PAY)

## 2020-08-06 PROCEDURE — 9900000073 HC MANUAL THERAPY PER 15 MIN (SELF-PAY)

## 2020-08-06 NOTE — FLOWSHEET NOTE
[] [address]   Outpatient Rehabilitation &  Therapy  [address] [facility]  P: [phone]  F: [phone]     Physical Therapy Daily Treatment Note    Date:  2020  Patient Name:  Jessica Coffey    :  1985  MRN: [id number]  Physician: Dr. Lara Bones: [employer]  Medical Diagnosis: R hip pain         Rehab Codes: M25.551  Onset date: 19                          Next 's appt.: prn  Visits: 10/20    Cancellations/No shows:  0/0    Subjective:    Pain:  [x] Yes  [] No Location: R hip Pain Rating: (0-10 scale) 1-2/10  Pain altered Tx:  [x] No  [] Yes  Action:  Comments:Pt reports her hips and upper back have been somewhat painful.  SHe discovered part of the pain was from poor posture at her standing desk so after she changed this the pain reduced to the current level     Objective:  Manual: MET to correct pelvic dysfunction, DI hip flexor proximal and distal, glute med, MOB FRSR T6-8, MET R post rib 7  Precautions: standard  Exercises:  Exercise Reps/ Time Weight/ Level Issued for HEP   Comments   Prone             Flying squirrels 2x10  x x 2 pillows   Hip ext (glut max)  2x10   12/17  2 pillows; monitor pelvic rotation   ADIM 15   12/      Supine             Hip flexor s 2'   x      Posture Bridges w/march 2 sets   x      Bridge  20   x  No pelvic rotation   Pelvic floor yarely 5sec hold 5 sec  eccentric   x      Quadruped        ADIM 20  12/17     Arm/leg raise 20  12/17     Sidelying             Warrior stretch x5 ea   x x Pillow between knees   [place] stretch x5 ea   x x Pillow between knees   Clams 90/30 deg 2 sets   x --     Side plank dips x10   x x     Gym             [place] calf stretch 2'ea   x    Supine thoracic extension on 1/2 foam roller x3'       Wall pushover running drill x30       [place] Single leg deadlift 2x15 [place]  KB   barefoot    plank w/ KB pull through Verbal review Purple KB x     MOBO board deadlift 2x10  x     Dynamic Demonstrates/verbalizes HEP/Ed previously given. Plan: [x] Continue per plan of care.    [x] Other:Follow up prn  Time In: 1810 Time Out: [address] [place]    Electronically signed by:  Vicky Valdez PT

## 2020-09-24 ENCOUNTER — HOSPITAL ENCOUNTER (OUTPATIENT)
Dept: PHYSICAL THERAPY | Facility: CLINIC | Age: 35
Discharge: HOME | End: 2020-09-24

## 2020-09-24 PROCEDURE — 9900000073 HC MANUAL THERAPY PER 15 MIN (SELF-PAY)

## 2020-09-24 NOTE — FLOWSHEET NOTE
[] [address]   Outpatient Rehabilitation &  Therapy  1500 [facility]  P: [phone]  F: [phone]     Physical Therapy Daily Treatment Note    Date:  2020  Patient Name:  Melissa Jj    :  1985  MRN: [id number]  Physician: Dr. Dove Levo: [employer]  Medical Diagnosis: R hip pain         Rehab Codes: M25.551  Onset date: 19                          Next 's appt.: prn  Visits:     Cancellations/No shows:  0/0    Subjective:    Pain:  [x] Yes  [] No Location: R hip Pain Rating: (0-10 scale) 2-3/10  Pain altered Tx:  [x] No  [] Yes  Action:  Comments:Pt reports her R hip and lower leg have been painful. She states she has tried the lacrosse ball as well as roller but just can't get the pain to resolve.  Feels like hips and upper back are a little off    Objective:  Manual: MET to correct pelvic dysfunction, DI hip flexor proximal and distal, glute med, MOB FRSR T6-8, PA glide T2-8, Gastroc TP release medial and lateral  Precautions: standard  Exercises:  Exercise Reps/ Time Weight/ Level Issued for HEP   Comments   Prone             Flying squirrels 2x10  x  2 pillows   Hip ext (glut max)  2x10     2 pillows; monitor pelvic rotation   ADIM 15         Supine             Hip flexor s 2'   x      Posture Bridges w/march 2 sets   x      Bridge  20   x  No pelvic rotation   Pelvic floor yarely 5sec hold 5 sec  eccentric   x      Quadruped        ADIM 20       Arm/leg raise 20       Sidelying             Warrior stretch x5 ea   x  Pillow between knees   [place] stretch x5 ea   x  Pillow between knees   Clams 90/30 deg 2 sets   x --     Side plank dips x10   x      Gym             Lax ball foot self mob x20   x    Step calf stretch 2'   x    Supine thoracic extension on  foam roller x3'       Wall pushover running drill x30       [place] Single leg deadlift 2x15 [place]  KB   barefoot    plank w/ KB pull through Verbal review Purple KB x     MOBO board deadlift 2x10  x     Dynamic lunge 2laps  x     Running drill 2x30\"  x     Arm Circles x20 Blue x     Pull aparts x20 Blue x     Eccentric calf 1 set  x     Lunges             Monster walks  2 laps  black  x       Heel taps             Step downs         Posterior and lateral   Other:      Specific Instructions for next treatment:      Treatment Charges: Mins Units   []  Modalities     [x]  Ther Exercise 3 nc   [x]  Manual Therapy 42 3   []  Ther Activities     []  Aquatics     []  Vasocompression     []  NMR     Total Treatment time 45 3       Assessment: [x] Progressing toward goals. Pt was painfree after manual to correct upslip and spasm at glute med, hip flexors and R gastroc. Pt does test 1/2 grade weaker at R glute med. Reviewed exercises to correct this imbalance and educated on posture with standing again as well as posture with running. STG: (to be met in 10 treatments)  1. ? Pain: <3/10 at worst to allow pt to continue to run  2. ? Strength:5/5 B hip strength to allow pt to hold corrections made with manual techniques  3. ? Function: Pt will be able to run up to 4 miles with <3/10 pain Hip, ribs and pelvis  4. Independent with Home Exercise Programs     LTG: (to be met in 20 treatments)  1. Increase functional strength to allow pt to demonstrate little to no deviation with single leg squat   2. Pt able to run painfree as she wishes with acceptable run mechanics. Patient goals:Build strength so can return to 25 miles/wk of running and run 1/2 marathon at [facility]      Pt. Education:  [x] Yes  [] No  [] Reviewed Prior HEP/Ed  Method of Education: [x] Verbal  [] Demo  [x] Written- for glut max ex and calf S  Comprehension of Education:  [x] Verbalizes understanding. [] Demonstrates understanding. [] Needs review. [] Demonstrates/verbalizes HEP/Ed previously given. Plan: [x] Continue per plan of care.    [x] Other:Follow up prn  Time In: 6434 Time Out: 9636    Electronically signed by:  Mihir Matthews, PT

## 2020-11-10 ENCOUNTER — HOSPITAL ENCOUNTER (OUTPATIENT)
Dept: PHYSICAL THERAPY | Facility: CLINIC | Age: 35
Discharge: HOME | End: 2020-11-10

## 2020-11-10 PROCEDURE — 9900000073 HC MANUAL THERAPY PER 15 MIN (SELF-PAY)

## 2020-11-10 NOTE — FLOWSHEET NOTE
[] [address]   Outpatient Rehabilitation &  Therapy  1500 [facility]  P: [phone]  F: [phone]     Physical Therapy Daily Treatment Note    Date:  11/10/2020  Patient Name:  Digna Phiilppe    :  1985  MRN: [id number]  Physician: Dr. Lynne Longo: [employer]  Medical Diagnosis: R hip pain         Rehab Codes: M25.551  Onset date: 19                          Next Dr's appt.: prn  Visits:     Cancellations/No shows:  0/0    Subjective:    Pain:  [x] Yes  [] No Location: R hip Pain Rating: (0-10 scale) 2-3/10  Pain altered Tx:  [x] No  [] Yes  Action:  Comments:Pt reports she has had to take the last week off of running due to R hip and calf pain. States she was able to roll the calf and get it to loosen up but couldn't get the hip to cooperate and can feel like 1 leg is shorter than the other.     Objective:  Manual: MET to correct pelvic dysfunction, DI hip flexor proximal and distal, glute med,piriformis MOB FRSR T6-8, PA glide T2-8  Precautions: standard  Exercises:  Exercise Reps/ Time Weight/ Level Issued for HEP   Comments   Prone             Flying squirrels 2x10  x x 2 pillows   Hip ext (glut max)  2x10   /  2 pillows; monitor pelvic rotation   ADIM 15         Supine             Hip flexor s 2'   x      Posture Bridges w/march 2 sets   x      Bridge  20   x  No pelvic rotation   Pelvic floor yarely 5sec hold 5 sec  eccentric   x      Quadruped        ADIM 20       Arm/leg raise 20       Sidelying             Warrior stretch x5 ea   x  Pillow between knees   [place] stretch x5 ea   x  Pillow between knees   Clams 930 deg 2 sets   x x     Side plank dips x10   x      Gym             Lax ball foot self mob x20       Step calf stretch 2'       Supine thoracic extension on / foam roller x3'       Wall pushover running drill x30       [place] Single leg deadlift 2x15 [place]  KB   barefoot    plank w/ KB pull through Verbal review Purple KB x     MOBO board deadlift 2x10  x     Dynamic lunge 2laps  x     Running drill 2x30\"  x     Arm Circles x20 Blue x     Pull aparts x20 Blue x     Eccentric calf 1 set  x     Lunges             Monster walks  2 laps  black  x       Heel taps             Step downs         Posterior and lateral   Other:      Specific Instructions for next treatment:      Treatment Charges: Mins Units   []  Modalities     [x]  Ther Exercise 3 nc   [x]  Manual Therapy 30 2   []  Ther Activities     []  Aquatics     []  Vasocompression     []  NMR     Total Treatment time 33 2       Assessment: [x] Progressing toward goals. Pt had R upslip and R FRSR T6-8 with post rib 8. These all corrected nicely with manual techniques with a large cavitation with shotgun maneuver. Pt was painfree leaving. Instructed her not to do any speed work the rest of the week and do easy miles as she has a 1/2 marathon this weekend  STG: (to be met in 10 treatments)  1. ? Pain: <3/10 at worst to allow pt to continue to run  2. ? Strength:5/5 B hip strength to allow pt to hold corrections made with manual techniques  3. ? Function: Pt will be able to run up to 4 miles with <3/10 pain Hip, ribs and pelvis  4. Independent with Home Exercise Programs     LTG: (to be met in 20 treatments)  1. Increase functional strength to allow pt to demonstrate little to no deviation with single leg squat   2. Pt able to run painfree as she wishes with acceptable run mechanics. Patient goals:Build strength so can return to 25 miles/wk of running and run 1/2 marathon at [facility]      Pt. Education:  [x] Yes  [] No  [] Reviewed Prior HEP/Ed  Method of Education: [x] Verbal  [] Demo  [x] Written- for glut max ex and calf S  Comprehension of Education:  [x] Verbalizes understanding. [] Demonstrates understanding. [] Needs review. [] Demonstrates/verbalizes HEP/Ed previously given. Plan: [x] Continue per plan of care.    [x] Other:Follow up prn  Time In:1135Time Out: 1208    Electronically signed by:  Monica Jama PT

## 2021-01-07 ENCOUNTER — HOSPITAL ENCOUNTER (OUTPATIENT)
Dept: PHYSICAL THERAPY | Facility: CLINIC | Age: 36
Discharge: HOME | End: 2021-01-07

## 2021-01-07 PROCEDURE — 9900000067 HC THERAPEUTIC EXERCISE EA 15 MINS (SELF-PAY)

## 2021-01-07 PROCEDURE — 9900000073 HC MANUAL THERAPY PER 15 MIN (SELF-PAY)

## 2021-01-07 NOTE — FLOWSHEET NOTE
[] [address]   Outpatient Rehabilitation &  Therapy  1500 [facility]  P: [phone]  F: [phone]     Physical Therapy Daily Treatment Note    Date:  2021  Patient Name:  Juliocesar Kaplan    :  1985  MRN: [id number]  Physician: Dr. Cheri Martinez: [employer]  Medical Diagnosis: R hip pain         Rehab Codes: M25.551  Onset date: 19                          Next 's appt.: prn  Visits:     Cancellations/No shows:  0/0    Subjective:    Pain:  [x] Yes  [] No Location: R hip Pain Rating: (0-10 scale) 2/10  Pain altered Tx:  [x] No  [] Yes  Action:  Comments:Pt reports she has been doing some speed work and just feels locked up at upper back and hip on R    Objective:  Manual: MET to correct pelvic dysfunction, DI hip flexor proximal and distal, glute med,piriformis MOB FRSR T6-8, PA glide T2-8, MET to correct posterior rib 6-8, Hypervolt B TFL, quad, Crosshand to R UT, manual cervical distraction  Precautions: standard  Exercises:  Exercise Reps/ Time Weight/ Level Issued for HEP   Comments   Prone             Flying squirrels 2x10  x  2 pillows   Hip ext (glut max)  2x10     2 pillows; monitor pelvic rotation   ADIM 15         Supine             Hip flexor s 2'   x      Posture Bridges /march 2 sets   x      Bridge  20   x  No pelvic rotation   Pelvic floor yarely 5sec hold 5 sec  eccentric   x      Quadruped        ADIM 20       Arm/leg raise 20       Sidelying             Warrior stretch x5 ea   x x Pillow between knees   [place] stretch x5 ea   x x Pillow between knees   Clams 930 deg 2 sets   x      Side plank dips x10   x      Gym             Foam roller lat/ pect stretch stretch x10   x    Lax ball foot self mob x20       Step calf stretch 2'       Supine thoracic extension on 1/2 foam roller x3'       Wall pushover running drill x30       [place] Single leg deadlift 2x15 [place]  KB   barefoot plank w/ KB pull through Verbal review Purple KB x     MOBO board deadlift 2x10  x     Dynamic lunge 2laps  x     Running drill 2x30\"  x     Arm Circles x20 Blue x     Pull aparts x20 Blue x     Eccentric calf 1 set  x     Lunges             Monster walks  2 laps  black  x       Heel taps             Step downs         Posterior and lateral   Other:      Specific Instructions for next treatment:      Treatment Charges: Mins Units   []  Modalities     [x]  Ther Exercise 10 1   [x]  Manual Therapy 35 2   []  Ther Activities     []  Aquatics     []  Vasocompression     []  NMR     Total Treatment time 45 3       Assessment: [x] Progressing toward goals. Pt had R upslip and R FRSR T6-8 with post rib 6- 8. Good corrections with manual techniques and followed this with stretching to decrease pect, and lat tightness and mobilize Tspine  STG: (to be met in 10 treatments)  1. ? Pain: <3/10 at worst to allow pt to continue to run  2. ? Strength:5/5 B hip strength to allow pt to hold corrections made with manual techniques  3. ? Function: Pt will be able to run up to 4 miles with <3/10 pain Hip, ribs and pelvis  4. Independent with Home Exercise Programs     LTG: (to be met in 20 treatments)  1. Increase functional strength to allow pt to demonstrate little to no deviation with single leg squat   2. Pt able to run painfree as she wishes with acceptable run mechanics. Patient goals:Build strength so can return to 25 miles/wk of running and run 1/2 marathon at [facility]      Pt. Education:  [x] Yes  [] No  [] Reviewed Prior HEP/Ed  Method of Education: [x] Verbal  [] Demo  [x] Written- for glut max ex and calf S  Comprehension of Education:  [x] Verbalizes understanding. [] Demonstrates understanding. [] Needs review. [] Demonstrates/verbalizes HEP/Ed previously given. Plan: [x] Continue per plan of care.    [x] Other:Follow up prn  Time In:1035Time Out: 1120 Electronically signed by:  Jacobo Biggs, PT

## 2021-01-21 ENCOUNTER — HOSPITAL ENCOUNTER (OUTPATIENT)
Dept: PHYSICAL THERAPY | Facility: CLINIC | Age: 36
Discharge: HOME | End: 2021-01-21

## 2021-01-21 PROCEDURE — 97110 THERAPEUTIC EXERCISES: CPT

## 2021-01-21 PROCEDURE — 9900000073 HC MANUAL THERAPY PER 15 MIN (SELF-PAY)

## 2021-01-21 PROCEDURE — 97140 MANUAL THERAPY 1/> REGIONS: CPT

## 2021-01-21 PROCEDURE — 9900000067 HC THERAPEUTIC EXERCISE EA 15 MINS (SELF-PAY)

## 2021-01-21 NOTE — FLOWSHEET NOTE
[] [address]   Outpatient Rehabilitation &  Therapy  [address] [facility]  P: [phone]  F: [phone]     Physical Therapy Daily Treatment Note    Date:  2021  Patient Name:  Pennie Sanford    :  1985  MRN: [id number]  Physician: Dr. Dilma Block: [employer]  Medical Diagnosis: R hip pain         Rehab Codes: M25.551  Onset date: 19                          Next 's appt.: prn  Visits:     Cancellations/No shows:  0/0    Subjective:    Pain:  [x] Yes  [] No Location: R hip Pain Rating: (0-10 scale) 2/10  Pain altered Tx:  [x] No  [] Yes  Action:  Comments:Pt reports she did a tempo run and R hamstring is flared up.     Objective:  Manual:    Precautions: standard  Exercises:  Exercise Reps/ Time Weight/ Level Issued for HEP   Comments   Prone             Flying squirrels 2x10  x  2 pillows   Hip ext (glut max)  2x10     2 pillows; monitor pelvic rotation   ADIM 15         Supine             Hip flexor s 2'   x      Posture Bridges /march 2 sets   x      Bridge marches 20   x  No pelvic rotation   Pelvic floor yarely 5sec hold 5 sec  eccentric   x      Quadruped        ADIM 20       Arm/leg raise 20       Sidelying             Warrior stretch x5 ea   x x Pillow between knees   [place] stretch x5 ea   x x Pillow between knees   Clams 930 deg 2 sets   x      Side plank dips x10   x      Gym             Lateral step downs 2x1' 6\"  x    Marching w/ TB 20 blue  x Stable L spine   Foam roller lat/ pect stretch stretch x10   x    Lax ball foot self mob x20       Step calf stretch 2'       Supine thoracic extension on  foam roller x3'       Wall pushover running drill x30       [place] Single leg deadlift 2x15 [place]  KB   barefoot    plank w/ KB pull through Verbal review Purple KB x     MOBO board deadlift 2x10  x     Dynamic lunge 2laps  x     Running drill 2x30\"  x     Arm Circles x20 Blue x     Pull aparts x20 Blue x Eccentric calf 1 set  x     Lunges             Monster walks  2 laps  black  x       Heel taps             Step downs         Posterior and lateral   Other:      Specific Instructions for next treatment:      Treatment Charges: Mins Units   []  Modalities     [x]  Ther Exercise 10 1   [x]  Manual Therapy 35 2   []  Ther Activities     []  Aquatics     []  Vasocompression     []  NMR     Total Treatment time 45 3       Assessment: [x] Progressing toward goals. Neg SI. Palpation revealed + QL tenderness only. R hip flexor, buttock were neg. Mod on R hamstring muscle belly. Lateral stepdowns at 90spm produced significant glut fatigue. 5/5 MMT with R hip and hamstring at 90/10 deg. Neg shoe off test.  Neg SLR  STG: (to be met in 10 treatments)  1. ? Pain: <3/10 at worst to allow pt to continue to run  2. ? Strength:5/5 B hip strength to allow pt to hold corrections made with manual techniques  3. ? Function: Pt will be able to run up to 4 miles with <3/10 pain Hip, ribs and pelvis  4. Independent with Home Exercise Programs     LTG: (to be met in 20 treatments)  1. Increase functional strength to allow pt to demonstrate little to no deviation with single leg squat   2. Pt able to run painfree as she wishes with acceptable run mechanics. Patient goals:Build strength so can return to 25 miles/wk of running and run 1/2 marathon in [address] [place] [address]     Pt. Education:  [x] Yes  [] No  [x] Reviewed Prior HEP/Ed  Method of Education: [x] Verbal  [x] Demo  [] Written  Comprehension of Education:  [x] Verbalizes understanding. [] Demonstrates understanding. [] Needs review. [] Demonstrates/verbalizes HEP/Ed previously given. Plan: [] Continue per plan of care.    [x] Other:Follow up prn    Time In:0930  Time Out: 1389    Electronically signed by:  Gerald Abad, PT

## 2021-04-29 ENCOUNTER — HOSPITAL ENCOUNTER (OUTPATIENT)
Dept: PHYSICAL THERAPY | Facility: CLINIC | Age: 36
Setting detail: THERAPIES SERIES
Discharge: HOME | End: 2021-04-29

## 2021-04-29 PROCEDURE — 9900000067 HC THERAPEUTIC EXERCISE EA 15 MINS (SELF-PAY)

## 2021-04-29 PROCEDURE — 9900000073 HC MANUAL THERAPY PER 15 MIN (SELF-PAY)

## 2021-04-29 NOTE — FLOWSHEET NOTE
[] [place] [address]  Outpatient Rehabilitation &  Therapy  [address] [place] [address]  P: [phone]  F: [phone]     Physical Therapy Daily Treatment Note    Date:  2021  Patient Name:  Juan Jose Paul    :  1985  MRN: [id number]  Physician: Dr. Patricia Street: [employer]  Medical Diagnosis: R hip pain         Rehab Codes: M25.551  Onset date: 19                          Next 's appt.: prn  Visits:     Cancellations/No shows:  0/0    Subjective:    Pain:  [x] Yes  [] No Location: L calf Pain Rating: (0-10 scale) 2/10  Pain altered Tx:  [x] No  [] Yes  Action:  Comments:Pt reports she is 4 weeks from her race  marathon and has been doing more tempo work and her calves are getting very tight and she is getting L foot pain as well   Objective:  Manual:  MET pubic correction, DI B hip flexor, glute med, piriformis, FRSR T6-8 Mob, L post rib 6-8 MET, Sidekick L peroneals , EDL, MFR B calves   Precautions: standard  Exercises:  Exercise Reps/ Time Weight/ Level Issued for HEP   Comments   Prone             Flying squirrels 2x10  x  2 pillows   Hip ext (glut max)  2x10     2 pillows; monitor pelvic rotation   ADIM 15         Supine             Hip flexor s 2'   x      Static UE band pull with Alt LE 2 sets   x  x    Bridge marches 20   x  No pelvic rotation   Pelvic floor yarely 5sec hold 5 sec  eccentric   x      Quadruped        ADIM 20       Arm/leg raise 20       Sidelying             War[place] stretch x5 ea   x  Pillow between knees   [place] stretch x5 ea   x  Pillow between knees   Clams 930 deg 2 sets   x      Side plank w/hip abd x10   x x     Gym             Star squat x10  x x Furniture slider   Marching w/ TB 20 blue   Stable L spine   Foam roller lat/ pect stretch stretch x10       Lax ball foot self mob x20       Dynamic calf stretch x10 ea  x x    Dynamic hip flexor stretch x10ea  x x    Supine thoracic extension on  foam signed by:  Jeanette Diaz, PT

## 2021-05-20 ENCOUNTER — HOSPITAL ENCOUNTER (OUTPATIENT)
Dept: PHYSICAL THERAPY | Facility: CLINIC | Age: 36
Discharge: HOME | End: 2021-05-20

## 2021-05-20 PROCEDURE — 9900000073 HC MANUAL THERAPY PER 15 MIN (SELF-PAY)

## 2021-07-13 ENCOUNTER — HOSPITAL ENCOUNTER (OUTPATIENT)
Dept: PHYSICAL THERAPY | Facility: CLINIC | Age: 36
Discharge: HOME | End: 2021-07-13
Payer: COMMERCIAL

## 2021-07-13 PROCEDURE — 9900000073 HC MANUAL THERAPY PER 15 MIN (SELF-PAY)

## 2021-07-13 PROCEDURE — 9900000072 HC NEUROMUSCULAR RE-EDUCATION (SELF-PAY)

## 2021-07-13 PROCEDURE — 9900000067 HC THERAPEUTIC EXERCISE EA 15 MINS (SELF-PAY)

## 2021-07-13 NOTE — FLOWSHEET NOTE
2 laps  black  x       Heel taps             Step downs         Posterior and lateral   Other:      Specific Instructions for next treatment:  NMR  Wall A drill  Static to dynamic run employing A drill    Treatment Charges: Mins Units   []  Modalities     [x]  Ther Exercise 15 1   [x]  Manual Therapy 15 1   []  Ther Activities     []  Aquatics     []  Vasocompression     [x]  NMR 10 1   Total Treatment time 40 3       Assessment: [x] Progressing toward goals. Pt 's iliac crests are level this date. Testing of glute med reveals significant difference with R 4/5 L 5-/5. With running pt is is leaning from the hips causing knee to pass toe at midstance. Did A drill statically at wall then did it dynamically across clinic. Nice change in the amount of R hip adduction that occurred at IC and pt no longer leaning forward from the trunk and was able to engage glute and unlock hip to prevent knee from passing toe at midstance. She is to perform pointed glute med strengthening added to her gym routine and drill to improve run mechanics to get improved glute recruitment. STG: (to be met in 10 treatments)  1. ? Pain: <3/10 at worst to allow pt to continue to run  2. ? Strength:5/5 B hip strength to allow pt to hold corrections made with manual techniques  3. ? Function: Pt will be able to run up to 4 miles with <3/10 pain Hip, ribs and pelvis  4. Independent with Home Exercise Programs     LTG: (to be met in 20 treatments)  1. Increase functional strength to allow pt to demonstrate little to no deviation with single leg squat   2. Pt able to run painfree as she wishes with acceptable run mechanics. Patient goals:Build strength so can return to 25 miles/wk of running and run 1/2 marathon in [place]     Pt. Education:  [x] Yes  [] No  [x] Reviewed Prior HEP/Ed  Method of Education: [x] Verbal  [x] Demo  [] Written  Comprehension of Education:  [x] Verbalizes understanding. [] Demonstrates understanding.   [] Needs review. [] Demonstrates/verbalizes HEP/Ed previously given. Plan: [] Continue per plan of care.    [x] Other:Follow up prn    Time In:1305  Time Out: 5966    Electronically signed by:  Suni Call PT

## 2022-03-10 ENCOUNTER — HOSPITAL ENCOUNTER (OUTPATIENT)
Dept: PHYSICAL THERAPY | Facility: CLINIC | Age: 37
Setting detail: THERAPIES SERIES
Discharge: HOME | End: 2022-03-10

## 2022-03-10 PROCEDURE — 97161 PT EVAL LOW COMPLEX 20 MIN: CPT

## 2022-03-10 PROCEDURE — 97140 MANUAL THERAPY 1/> REGIONS: CPT

## 2022-03-10 PROCEDURE — 9900000067 HC THERAPEUTIC EXERCISE EA 15 MINS (SELF-PAY)

## 2022-03-10 PROCEDURE — 9900000066 HC EVALUATION (SELF-PAY)

## 2022-03-10 PROCEDURE — 97750 PHYSICAL PERFORMANCE TEST: CPT

## 2022-03-10 PROCEDURE — 9900000073 HC MANUAL THERAPY PER 15 MIN (SELF-PAY)

## 2022-03-10 PROCEDURE — 9900000072 HC NEUROMUSCULAR RE-EDUCATION (SELF-PAY)

## 2022-03-10 PROCEDURE — 97110 THERAPEUTIC EXERCISES: CPT

## 2022-03-10 NOTE — CONSULTS
[x] [address]   Outpatient Rehabilitation &  Therapy  [address] [place] [address]  P: [phone]  F: [phone] [] [address]  P: [phone]  F: [phone] [] [address]  [facility]   [place]: [phone]  F: [phone]         Physical Therapy Running Evaluation    Date:  3/10/2022  Patient: Vick Lujan   : 1985  MRN: [id number]  Physician: Dr. Germain Hamman: [employer] ([id number] remaining)  Medical Diagnosis:  L ITB syndrome  Rehab Codes: M25.562, R26.89  Onset date:  22   Next 's appt.:prn    Subjective:   CC:Pt reports she had to take 7 weeks off due to labynthitis. Started running again in Feb and did 3 miles 4-5 days per week and some strengthening. Gets worse the longer I go. Also hurts if I walk for a long time at the treadmill desk. 3 hs in I will get L lateral knee pain. Also bruised R foot dropping a laptop on it 2 weeks ago and took 6 days off now back to 3 miles.      PMHx: [] Unremarkable [] Diabetes [] HTN  [] Pacemaker   [] MI/Heart Problems [] Cancer [] Arthritis  [] Other:              [x] Refer to full medical chart  In EPIC     Tests: [] X-Ray: [] MRI:  [x] none:     Medications: [x] Refer to full medical record [] None [] Other:  Allergies:      [x] Refer to full medical record [] None [] Other:    Working:  [x] Normal Duty  [] Light Duty  [] Off D/T Condition  [] Retired    [] Not Employed    []  Disability  [] Other:           Return to work:     Job/ADL Description:  [address]  Next goal race: in 8 weeks    Pain:  [x] Yes  [] No   Location:  L lateral knee Pain Rating: (0-10 scale) 4/10      Pain altered Tx:  [] Yes  [x] No  Action:  Symptoms:  [] Improving [] Worsening [x] Same  Better:  [] Meds    [] Ice pack    [] Sit    [x] Not running  []Stand    [] Walk    [] Stretching   [] Other:  Worse: [x] Run>4 miles    [] Easy    [] Speed work    []Stand    [] Walk    [] Stairs    [] Sit    [] Other:  Sleep: [x] OK    [] Disturbed    Objective:    ROM  ° A/P STRENGTH TESTS (+/-) Left Right Not Tested    Left Right Left Right Ant. Drawer   []   Hip Flex WNL WNL   Post. Drawer   []   Ext WNL WNL 4 4+ Lachmans   []   ER WNL WNL 5 5 Valgus Stress   []   IR WNL WNL   Varus Stress   []   ABD WNL WNL 4+ 4+ Teresitas   []   ADD     Pat-Fem Grind   []   Knee Flex WNL WNL 4+ 5 FADIRs   []   Ext WNL WNL   Hip Scouring   []   Ankle DF limted 10% WNL   CASTILLOs   []   PF WNL WNL   Piriformis   []   INV WNL WNL   Akankshas   []   EVER WNL WNL   Cedrick     []   GTE limited 10%    Reynaldo's   []       OBSERVATION No Deficit Deficit Not Tested Comments   Iliac Crest [x] [] []    Thoracic spine [] [x] [] FRSR T4-6   Palpation [] [x] [] glute med, piriformis, TFL, vastus lateralis , gastroc and soleus spasm   Gait [] [x] [] Analysis:  See Video Run Analysis   Video Run Analysis   Pace: 9:31  min/mile    Shoes: Lara   Nora: 176 spm    Frontal plane deviations:    Fast pronation moment L    Increased toe out    Medial heel whip L    Mild genu valgus    Contralateral pelvic drop    Increased hip adduction    Lateral trunk bend L    Increased cross body arm swing       Sagittal plane deviations:     none        Other:      Recommendations:    Change arm swing midline of side body and back. No swinging across body      FUNCTIONAL TESTS PAIN NO PAIN COMMENTS   1 legged squat [] [x] Very mild genu valgus   Posterior tibialis dysfunction [] [x] Present L   # of 1 legged calf raises L14 R20        Functional Test: UWRI Score: 50% functionally impaired     Comments:  Assessment:Patient would benefit from skilled physical therapy services in order to: increase ankle and great toe ROM, hip strength and extrinsic and intrinsic foot strength  Problems:    [x] ? Pain:     [x] ? ROM:    [x] ? Strength:    [x] ?  Function: Not able to run as she wishes   [] ? Balance  [] Increased edema:  [] Postural Deviations  [x] Gait Deviations  [x]  UWRI    [] Other:      STG: (to be met in 5 treatments)  1. ? Pain:<3/10 pain L knee to allow her to continue to train  2. ? ROM:Normal ankle DF and GTE to allow for normal LE mechanics  3. ? Strength: 5/5 hip strength B to allow for normal LE mechanics  4. ? Function: able to run 7 miles for long run with <3/10 pain L lateral knee  5. Independent with Home Exercise Programs    LTG: (to be met in 10 treatments)  1. No pain L knee to allow pt to run 1/2 marathon  2. Pt able to demonstrate little to no genu valgus with fwd impact lunge and to demonstrate 20 reps of SL HR through full ROM with no valgus collapse at the foot to allow for acceptable running mechanics    3. UWRI score to 32/36  4. Patient goals:get rid of pain so I can do a 1/2 in 8 weeks    Rehab Potential:  [x] Good  [] Fair  [] Poor   Suggested Professional Referral:  [x] No  [] Yes:  Barriers to Goal Achievement[de-identified]  [x] No  [] Yes:  Domestic Concerns:  [x] No  [] Yes:    Pt. Education:  [x] Plans/Goals, Risks/Benefits discussed  [x] Home exercise program    Method of Education: [x] Verbal  [x] Demo  [x] Written- as per marked on log  Comprehension of Education:  [x] Verbalizes understanding. [] Demonstrates understanding. [] Needs Review. [] Demonstrates/verbalizes understanding of HEP/Ed previously given.     Treatment Plan:  [x] Therapeutic Exercise    [x] Therapeutic Activity  [x] Manual Therapy   [x] Alter G treadmill  [x] Phys perf test     [x] Vasocompression/Game Ready   [x] Neuromuscular Re-education [x] Instruction in HEP                                Frequency:  1x/week for 10 visits    Todays Treatment:  Manual: Hypervolt Calf,TFL, vastus lateralis, DI glute med , piriformis   Precautions: standard  Exercises:  Exercise Reps/ Time Weight/ Level Issued for HEP  Comments   Prone        Flying squirrels        Hip ext (glut max)        Rajan Valles

## 2022-03-29 ENCOUNTER — HOSPITAL ENCOUNTER (OUTPATIENT)
Dept: PHYSICAL THERAPY | Facility: CLINIC | Age: 37
Setting detail: THERAPIES SERIES
Discharge: HOME | End: 2022-03-29

## 2022-03-29 PROCEDURE — 9900000067 HC THERAPEUTIC EXERCISE EA 15 MINS (SELF-PAY)

## 2022-03-29 PROCEDURE — 9900000073 HC MANUAL THERAPY PER 15 MIN (SELF-PAY)

## 2022-03-29 NOTE — FLOWSHEET NOTE
[x] [address]   Outpatient Rehabilitation &  Therapy  [address]  P: [phone]  F: [phone]     Physical Therapy Daily Treatment Note    Date:  3/29/2022  Patient Name:  Sandor Epley    :  1985  MRN: [id number]  Physician: Dr. Johnson Meagher: Matias Moreno ([id number] remaining)  Medical Diagnosis:   L ITB syndrome                      Rehab Codes: M25.562, R26.89  Onset date:    22                          Next 's appt.:prn  Visit# / total visits: 2/10     Cancels/No Shows: 0    Subjective:    Pain:  [x] Yes  [] No Location: L knee Pain Rating: (0-10 scale) 0/10  Pain altered Tx:  [x] No  [] Yes  Action:  Comments:Pt reports she is up to 6 miles. She is having a mild amount of lateral knee pain but it resolves.  More of a niggle than an injury at this point  Objective:  Manual: Hypervolt Calf,TFL, vastus lateralis, DI glute med , piriformis   Precautions: standard  Exercises:  Exercise Reps/ Time Weight/ Level Issued for HEP   Comments   Prone             Quad strap stretch  3x30\"      x     Hip ext (glut max)             [place] hip ext             Supine             Hip flexor stretch buttock on foam roller x30\"   x x     1 legged bridges             PB hamstring curls         Hips to remain in neutral to ext   Bridge march             Sidelying             Clams 90/30 deg             1/2 side plank  hip abd x10   x      Quadruped             [place] twist             Gym             Burrito calf stretch x30\"ea   x      Ball arch roll into Great toe ext  10x10\"   x x     Toe yoga x10   x x     Supination/Pronation control x10 lime x x     Monster walks     x       Hip thrusts             Step downs     x   lateral   Posterior sling ex         On cable column   Ski jumper lunges             Functional reach             Reverse twisting lunge             RDLs         Retract scaps, one hand over, one hand under   Other:     Specific Instructions for next treatment:        Treatment Charges: Mins Units   []  Modalities     [x]  Ther Exercise 15 1   [x]  Manual Therapy 30 2   []  Ther Activities     []  Aquatics     []  Vasocompression     [x]  NMR 5 NC   Total Treatment time 50 3       Assessment: [x] Progressing toward goals. Pt continues with TFL and calf spasm. She is demonstrating posterior tib dysfunction and worked on control at ankle. [] No change. [] Other:  [x] Patient would continue to benefit from skilled physical therapy services in order to: increase ankle and great toe ROM, hip strength and extrinsic and intrinsic foot strength    STG: (to be met in 5 treatments)  1. ? Pain:<3/10 pain L knee to allow her to continue to train  2. ? ROM:Normal ankle DF and GTE to allow for normal LE mechanics  3. ? Strength: 5/5 hip strength B to allow for normal LE mechanics  4. ? Function: able to run 7 miles for long run with <3/10 pain L lateral knee  5. Independent with Home Exercise Programs     LTG: (to be met in 10 treatments)  1. No pain L knee to allow pt to run 1/2 marathon  2. Pt able to demonstrate little to no genu valgus with fwd impact lunge and to demonstrate 20 reps of SL HR through full ROM with no valgus collapse at the foot to allow for acceptable running mechanics    3. UWRI score to 32/36                    Patient goals:get rid of pain so I can do a 1/2 in 8 weeks    Pt. Education:  [x] Yes  [] No  [] Reviewed Prior HEP/Ed  Method of Education: [x] Verbal  [x] Demo  [] Written  Comprehension of Education:  [x] Verbalizes understanding. [] Demonstrates understanding. [] Needs review. [] Demonstrates/verbalizes HEP/Ed previously given. Plan: [x] Continue current frequency toward long and short term goals.     [] Specific Instructions for subsequent treatments:       Time In:1040            Time Out: 1130    Electronically signed by:  Andrzej Graham PT

## 2022-04-19 ENCOUNTER — HOSPITAL ENCOUNTER (OUTPATIENT)
Dept: PHYSICAL THERAPY | Facility: CLINIC | Age: 37
Setting detail: THERAPIES SERIES
Discharge: HOME | End: 2022-04-19

## 2022-04-19 PROCEDURE — 9900000067 HC THERAPEUTIC EXERCISE EA 15 MINS (SELF-PAY)

## 2022-04-19 PROCEDURE — 9900000073 HC MANUAL THERAPY PER 15 MIN (SELF-PAY)

## 2022-04-19 NOTE — FLOWSHEET NOTE
[x] [address]    Outpatient Rehabilitation &  Therapy  [address]  P: [phone]  F: [phone]     Physical Therapy Daily Treatment Note    Date:  2022  Patient Name:  Jarvis Guevara    :  1985  MRN: [id number]  Physician: Dr. Fisher Blood: Guanaco Jacob (ZUBNPN remaining)  Medical Diagnosis:   L ITB syndrome                      Rehab Codes: M25.562, R26.89  Onset date:    22                          Next 's appt.:prn  Visit# / total visits: 3/10     Cancels/No Shows: 0    Subjective:    Pain:  [x] Yes  [] No Location: L knee Pain Rating: (0-10 scale) 0/10  Pain altered Tx:  [x] No  [] Yes  Action:  Comments:Pt reports she is up to 6 miles. She is having a mild amount of lateral knee pain but it resolves.  More of a niggle than an injury at this point  Objective:  Manual: Hypervolt Calf,TFL, vastus lateralis, DI glute med , piriformis   Precautions: standard  Exercises:  Exercise Reps/ Time Weight/ Level Issued for HEP   Comments   Prone             Quad strap stretch  3x30\"      x     Hip ext (glut max)             [place] hip ext             Supine             Hip flexor stretch buttock on foam roller 2'ea   x x     1 legged bridges             PB hamstring curls         Hips to remain in neutral to ext   Bridge marches             Sidelying             Clams 90/30 deg             1/2 side plank  hip abd 2x10   x      Quadruped             Italian twist             Gym             Burrito calf stretch x30\"ea   x      Ball arch roll into Great toe ext  Verbal review   x      Toe yoga Verbal review   x      Supination/Pronation control x10 lime x      Monster walks     x       Hip hike  3 sets to fatigue  4\"  x  x     Step downs     x   lateral   Cat/cow  x10    x  x Exaggerate the movement   Other:     Specific Instructions for next treatment:Recheck glute med strength and control  Pace 1000  Duration 5min  Shoe bolaños  Nora 178spm  Cue: relax shoulders      Treatment Charges: Mins Units   []  Modalities     [x]  Ther Exercise 30 1   [x]  Manual Therapy 15 2   []  Ther Activities     []  Aquatics     []  Vasocompression     [x]  NMR 5 NC   Total Treatment time 50 3       Assessment: [x] Progressing toward goals. Pt  Is having some difficulty with frontal plane deviation in single leg standing and falls into an anterior pelvic tilt. Worked a lot on hip hike to train pt in frontal plane this date. R glute med is significantly weaker than L.   [] No change. [] Other:  [x] Patient would continue to benefit from skilled physical therapy services in order to: increase ankle and great toe ROM, hip strength and extrinsic and intrinsic foot strength    STG: (to be met in 5 treatments)  1. ? Pain:<3/10 pain L knee to allow her to continue to train  2. ? ROM:Normal ankle DF and GTE to allow for normal LE mechanics  3. ? Strength: 5/5 hip strength B to allow for normal LE mechanics  4. ? Function: able to run 7 miles for long run with <3/10 pain L lateral knee  5. Independent with Home Exercise Programs     LTG: (to be met in 10 treatments)  1. No pain L knee to allow pt to run 1/2 marathon  2. Pt able to demonstrate little to no genu valgus with fwd impact lunge and to demonstrate 20 reps of SL HR through full ROM with no valgus collapse at the foot to allow for acceptable running mechanics    3. UWRI score to 32/36                    Patient goals:get rid of pain so I can do a 1/2 in 8 weeks    Pt. Education:  [x] Yes  [] No  [] Reviewed Prior HEP/Ed  Method of Education: [x] Verbal  [x] Demo  [] Written  Hip hike  Resume 1/2 side plank with hip abd  Comprehension of Education:  [x] Verbalizes understanding. [] Demonstrates understanding. [] Needs review. [] Demonstrates/verbalizes HEP/Ed previously given. Plan: [x] Continue current frequency toward long and short term goals.     [] Specific Instructions for subsequent treatments: Time In:1040            Time Out: 1130    Electronically signed by:  Teresita Ibanez, PT

## 2022-05-31 ENCOUNTER — HOSPITAL ENCOUNTER (OUTPATIENT)
Dept: PHYSICAL THERAPY | Facility: CLINIC | Age: 37
Setting detail: THERAPIES SERIES
Discharge: HOME | End: 2022-05-31

## 2022-05-31 PROCEDURE — 9900000067 HC THERAPEUTIC EXERCISE EA 15 MINS (SELF-PAY)

## 2022-05-31 PROCEDURE — 9900000073 HC MANUAL THERAPY PER 15 MIN (SELF-PAY)

## 2022-05-31 NOTE — FLOWSHEET NOTE
[x] [address]   Outpatient Rehabilitation &  Therapy  [address] [facility] [address]  P: [phone]  F: [phone]     Physical Therapy Daily Treatment Note    Date:  2022  Patient Name:  Digna Philippe    :  1985  MRN: [id number]  Physician: Dr. Pimentel Bound: [address] (01/57[facility] remaining)  Medical Diagnosis:   L ITB syndrome                      Rehab Codes: M25.562, R26.89  Onset date:    22                          Next 's appt.:prn  Visit# / total visits: 4/10     Cancels/No Shows: 0    Subjective:    Pain:  [x] Yes  [] No Location: L knee Pain Rating: (0-10 scale) 0/10  Pain altered Tx:  [x] No  [] Yes  Action:  Comments:Pt reports knee is doing better but R  foot is somewhat problematic  Objective:  Manual: Hypervolt Calf, MFR plantar fascia, PA glides 1st MTP, Taught self PA glide 1st MTP  Precautions: standard  Exercises:  Exercise Reps/ Time Weight/ Level Issued for HEP   Comments   Prone             Quad strap stretch  3x30\"           Hip ext (glut max)             [place] hip ext             Supine             Hip flexor stretch buttock on foam roller 2'ea   x      1 legged bridges             PB hamstring curls         Hips to remain in neutral to ext   Bridge marches             Sidelying             Clams 90/30 deg             1/2 side plank  hip abd 2x10   x      Quadruped             [place] twist             Gym             Burrito calf stretch x1'   x x     Ball arch roll into Great toe ext  x2   x      Reverse lunge to hip drive O17   x x     Monster walks            Hip hike  3 sets to fatigue  4\"  x  x     Ball HR x20   x  x    Soleus HR x20  x x    Other:     Specific Instructions for next treatment:Recheck glute med strength and control  Pace 1031  Duration 5min  Shoe brookshyperion  Nora 171spm  Cue: reset posture and lean forward from the ankles      Treatment Charges: Mins Units   []  Modalities     [x]  Ther Exercise 35 2   [x]  Manual Therapy 15 1   []  Ther Activities     []  Aquatics     []  Vasocompression     [x]  NMR 5 NC   Total Treatment time 55 3       Assessment: [x] Progressing toward goals. Worked on body positioning in single leg stand and double leg stand before initiating movement for standing exercises. Pt leads with pelvis and ribs causing pt to be less stable and inhibit glutes and core. She is limited in R great toe motion and ankle DF knee straight. This limitation in ROM is causing toe out and pt also demonstrates inability to go through full range on heel raises as well as get IN at rearfoot to effectively make her foot a rigid lever to push off of. This all leads to increased forefoot strike on R vs L and increased IR on the R in swing. [] No change. [] Other:  [x] Patient would continue to benefit from skilled physical therapy services in order to: increase ankle and great toe ROM, hip strength and extrinsic and intrinsic foot strength    STG: (to be met in 5 treatments)  1. ? Pain:<3/10 pain L knee to allow her to continue to train  2. ? ROM:Normal ankle DF and GTE to allow for normal LE mechanics  3. ? Strength: 5/5 hip strength B to allow for normal LE mechanics  4. ? Function: able to run 7 miles for long run with <3/10 pain L lateral knee  5. Independent with Home Exercise Programs     LTG: (to be met in 10 treatments)  1. No pain L knee to allow pt to run 1/2 marathon  2. Pt able to demonstrate little to no genu valgus with fwd impact lunge and to demonstrate 20 reps of SL HR through full ROM with no valgus collapse at the foot to allow for acceptable running mechanics    3. UWRI score to 32/36                    Patient goals:get rid of pain so I can do a 1/2 in 8 weeks    Pt. Education:  [x] Yes  [] No  [] Reviewed Prior HEP/Ed  Method of Education: [x] Verbal  [x] Demo  [] Written  Hip hike  Resume 1/2 side plank with hip abd  Comprehension of Education:  [x] Verbalizes understanding.   [] Demonstrates understanding. [] Needs review. [] Demonstrates/verbalizes HEP/Ed previously given. Plan: [x] Continue current frequency toward long and short term goals.     [] Specific Instructions for subsequent treatments:       Time In:1035            Time Out: 1130    Electronically signed by:  Marie Franco PT